# Patient Record
Sex: MALE | Race: BLACK OR AFRICAN AMERICAN
[De-identification: names, ages, dates, MRNs, and addresses within clinical notes are randomized per-mention and may not be internally consistent; named-entity substitution may affect disease eponyms.]

---

## 2021-12-22 ENCOUNTER — HOSPITAL ENCOUNTER (EMERGENCY)
Dept: HOSPITAL 53 - M ED | Age: 26
Discharge: HOME | End: 2021-12-22
Payer: COMMERCIAL

## 2021-12-22 VITALS — HEIGHT: 71 IN | WEIGHT: 156.53 LBS | BODY MASS INDEX: 21.91 KG/M2

## 2021-12-22 VITALS — SYSTOLIC BLOOD PRESSURE: 157 MMHG | DIASTOLIC BLOOD PRESSURE: 81 MMHG

## 2021-12-22 DIAGNOSIS — S01.80XA: ICD-10-CM

## 2021-12-22 DIAGNOSIS — S29.012A: ICD-10-CM
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
## 2021-12-22 DIAGNOSIS — S05.12XA: Primary | ICD-10-CM

## 2021-12-22 DIAGNOSIS — S09.90XA: ICD-10-CM
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
## 2021-12-22 NOTE — REP
INDICATION:

trauma, left eye swelling, pain with EOM



COMPARISON:

None.



TECHNIQUE:

Axial noncontrast images from the skull base to the vertex with coronal reformations.



This CT examination was performed using the following dose reduction techniques:

Automated exposure control, adjustment of mA and/or kv according to the patient's

size, and use of iterative reconstruction technique.



FINDINGS:

The ventricles, sulci, and cisterns are normal in position and appearance. Gray-white

differentiation is maintained.  No acute intracranial hemorrhage, mass/mass effect,

pathology or trauma/injury.  No evidence for acute infarction.  No extra-axial fluid

collection.  Calvarium is intact.  Paranasal sinuses and mastoid air cells are clear.



There is left periorbital/infraorbital soft tissue swelling and subcutaneous emphysema

consistent with history of trauma.  Visualized portions of the globe as well as ocular

musculature and intraconal structures appear symmetric, intact and without associated

obvious injury.



IMPRESSION:

Left periorbital/infraorbital injury.

No evidence for acute intracranial pathology or trauma/injury.





<Electronically signed by Rocky Mack > 12/22/21 [id number]

## 2021-12-22 NOTE — REP
INDICATION:

trauma



COMPARISON:

None.



TECHNIQUE:

AP, lateral, and swimmers views.



FINDINGS:

Alignment and kyphosis is maintained.  Vertebral bodies intact.  No acute fracture /

compression injury or subluxation.  No degenerative changes.  Paravertebral soft

tissues are normal.



IMPRESSION:

Normal thoracic spine series.  No evidence for acute injury.





<Electronically signed by Rocky Mack > 12/22/21 [id number]

## 2021-12-22 NOTE — REP
INDICATION:

trauma, left eye swelling, pain with EOM



COMPARISON:

None.



TECHNIQUE:

Axial noncontrast images through the facial bones to include the mandible with coronal

and sagittal re-formations.



FINDINGS:

Left periorbital soft tissue swelling with predominantly infraorbital subcutaneous

emphysema and mild proptosis.  A small amount of extraconal gas is identified along

the medial wall of the left orbit.  The globe as well as the ocular musculature,

orbital and intraconal fat as well as the central optic nerve and neurovascular bundle

all appear intact, symmetric and essentially normal.  The surrounding osseous orbital

walls and osseous structures appear intact and without obvious acute fracture.  The

sinuses are relatively well aerated and there is no fluid level to suggest associated

injury.



IMPRESSION:

Left sided periorbital/infraorbital soft tissue injuries as described above.  The left

eye including ocular musculature, intraorbital fat and neurovascular bundle appear

normal.





<Electronically signed by Rocky Mack > 12/22/21 [id number]

## 2021-12-22 NOTE — REP
INDICATION:

trauma, left eye swelling, pain with EOM



COMPARISON:

None.



TECHNIQUE:

Axial noncontrast images from the skull base to the thoracic inlet with coronal and

sagittal re-formations



This CT examination was performed using the following dose reduction techniques:

Automated exposure control, adjustment of mA and/or kv according to the patient's

size, and use of iterative reconstruction technique.



FINDINGS:

Normal alignment and lordosis is maintained.  Cervical vertebral bodies including

transverse processes and spinous processes are intact and there is no evidence for

acute fracture / compression injury or subluxation.  Spinal canal is patent.

Posterior elements are intact.  Paravertebral soft tissues are normal.



IMPRESSION:

Normal noncontrast cervical spine CT.

No evidence for acute pathology or trauma/injury.





<Electronically signed by Rocky Mack > 12/22/21 [id number]